# Patient Record
Sex: FEMALE | Race: WHITE | NOT HISPANIC OR LATINO | Employment: FULL TIME | URBAN - METROPOLITAN AREA
[De-identification: names, ages, dates, MRNs, and addresses within clinical notes are randomized per-mention and may not be internally consistent; named-entity substitution may affect disease eponyms.]

---

## 2014-09-03 LAB — PAP-ABSTRACT: NORMAL

## 2017-07-14 ENCOUNTER — TRANSFERRED RECORDS (OUTPATIENT)
Dept: HEALTH INFORMATION MANAGEMENT | Facility: CLINIC | Age: 52
End: 2017-07-14

## 2017-07-14 LAB — MAMMOGRAM: NORMAL

## 2019-03-27 ENCOUNTER — OFFICE VISIT (OUTPATIENT)
Dept: ENDOCRINOLOGY | Facility: CLINIC | Age: 54
End: 2019-03-27
Payer: COMMERCIAL

## 2019-03-27 VITALS
DIASTOLIC BLOOD PRESSURE: 85 MMHG | BODY MASS INDEX: 35.63 KG/M2 | HEIGHT: 67 IN | SYSTOLIC BLOOD PRESSURE: 143 MMHG | WEIGHT: 227 LBS | HEART RATE: 76 BPM

## 2019-03-27 DIAGNOSIS — E66.09 CLASS 2 OBESITY DUE TO EXCESS CALORIES WITHOUT SERIOUS COMORBIDITY WITH BODY MASS INDEX (BMI) OF 35.0 TO 35.9 IN ADULT: ICD-10-CM

## 2019-03-27 DIAGNOSIS — E66.812 CLASS 2 OBESITY DUE TO EXCESS CALORIES WITHOUT SERIOUS COMORBIDITY WITH BODY MASS INDEX (BMI) OF 35.0 TO 35.9 IN ADULT: ICD-10-CM

## 2019-03-27 DIAGNOSIS — E04.2 MULTIPLE THYROID NODULES: Primary | ICD-10-CM

## 2019-03-27 DIAGNOSIS — E03.9 HYPOTHYROIDISM, UNSPECIFIED TYPE: ICD-10-CM

## 2019-03-27 LAB
CALCIUM SERPL-MCNC: 9.3 MG/DL (ref 8.5–10.1)
T4 FREE SERPL-MCNC: 1.09 NG/DL (ref 0.76–1.46)
TSH SERPL DL<=0.005 MIU/L-ACNC: 1.32 MU/L (ref 0.4–4)

## 2019-03-27 PROCEDURE — 99203 OFFICE O/P NEW LOW 30 MIN: CPT | Mod: 25 | Performed by: INTERNAL MEDICINE

## 2019-03-27 PROCEDURE — 84443 ASSAY THYROID STIM HORMONE: CPT | Performed by: INTERNAL MEDICINE

## 2019-03-27 PROCEDURE — 99358 PROLONG SERVICE W/O CONTACT: CPT | Performed by: INTERNAL MEDICINE

## 2019-03-27 PROCEDURE — 36415 COLL VENOUS BLD VENIPUNCTURE: CPT | Performed by: INTERNAL MEDICINE

## 2019-03-27 PROCEDURE — 82310 ASSAY OF CALCIUM: CPT | Performed by: INTERNAL MEDICINE

## 2019-03-27 PROCEDURE — 86376 MICROSOMAL ANTIBODY EACH: CPT | Performed by: INTERNAL MEDICINE

## 2019-03-27 PROCEDURE — 84439 ASSAY OF FREE THYROXINE: CPT | Performed by: INTERNAL MEDICINE

## 2019-03-27 RX ORDER — LEVOTHYROXINE SODIUM 75 UG/1
75 TABLET ORAL
COMMUNITY
Start: 2018-02-01 | End: 2019-03-28

## 2019-03-27 RX ORDER — LEVOTHYROXINE SODIUM 75 UG/1
75 TABLET ORAL
Status: CANCELLED | OUTPATIENT
Start: 2019-03-27

## 2019-03-27 ASSESSMENT — MIFFLIN-ST. JEOR: SCORE: 1662.3

## 2019-03-27 NOTE — PROGRESS NOTES
Name: Sydney Madrigal is a 53 year old woman self-referred for evaluation of thyroid disease.    Chief Complaint   Patient presents with     Thyroid Problem       HPI:  Recent issues:  Here for evaluation of hypothyroidism and thyroid nodules  She had insurance change, plans to change physicians with her health care  Reviewed medical history from patient and Epic chart record        Pre-2006. Diagnosis of hypothyroidism  Details of initial evaluation, symptoms, lab testing not available  Started levothyroxine medication  She recalls chronic treatment with levothyroxine 0.075 mg daily dose  Has seen Hal Edmonds and Hector Teague[facility] [place]  2006. Previous diagnosis of thyroid nodules  Has had many thyroid U/S procedures in the past, possibly yearly... Scans at [facility] clinic  2006. Dominant thyroid nodule FNA.   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 Carey for general medicine evaluations.    PMH/PSH:  Past Medical History:   Diagnosis Date     Hyperglycemia during pregnancy      Hypothyroidism      Multiple thyroid nodules      Obesity      Reflux esophagitis      No past surgical history on file.    Family Hx:  No family history on file.      Social Hx:  Social History     Socioeconomic History     Marital status:      Spouse name: Not on file      Number of children: Not on file     Years of education: Not on file     Highest education level: Not on file   Occupational History     Not on file   Social Needs     Financial resource strain: Not on file     Food insecurity:     Worry: Not on file     Inability: Not on file     Transportation needs:     Medical: Not on file     Non-medical: Not on file   Tobacco Use     Smoking status: Never Smoker     Smokeless tobacco: Never Used   Substance and Sexual Activity     Alcohol use: Not on file     Drug use: Not on file     Sexual activity: Not on file   Lifestyle     Physical activity:     Days per week: Not on file     Minutes per session: Not on file     Stress: Not on file   Relationships     Social connections:     Talks on phone: Not on file     Gets together: Not on file     Attends Voodoo service: Not on file     Active member of club or organization: Not on file     Attends meetings of clubs or organizations: Not on file     Relationship status: Not on file     Intimate partner violence:     Fear of current or ex partner: Not on file     Emotionally abused: Not on file     Physically abused: Not on file     Forced sexual activity: Not on file   Other Topics Concern     Not on file   Social History Narrative     Not on file          MEDICATIONS:  has a current medication list which includes the following prescription(s): cholecalciferol and levothyroxine.    ROS:     ROS: 10 point ROS neg other than the symptoms noted above in the HPI.    GENERAL: energy good, wt stable; denies fevers, chills, night sweats.    HEENT: no dysphagia, odonophagia, diplopia, neck pain  THYROID:  no apparent hyper or hypothyroid symptoms  CV: no chest pain, pressure, palpitations  LUNGS: no SOB, DIXON, cough, wheezing   ABDOMEN: no diarrhea, constipation, abdominal pain  EXTREMITIES: no rashes, ulcers, edema  NEUROLOGY: no headaches, denies changes in vision, tingling, extremitiy numbness   MSK: achiness at hands and knees;  "denies muscle weakness  SKIN: no rashes or lesions  : no menses since hysterectomy 2014, has frequent hotflashes  PSYCH:  stable mood, no significant anxiety or depression  ENDOCRINE: no heat or cold intolerance    Physical Exam   VS: /85   Pulse 76   Ht 1.702 m (5' 7\")   Wt 103 kg (227 lb)   BMI 35.55 kg/m    GENERAL: AXOX3, NAD, well dressed, answering questions appropriately, appears stated age.  THYROID:  Fullness at left lower thyroid lobe though discrete nodules not palpable, gland size estimate 25 gm, no neck adenopathy  HEENT: neck non-tender, no exopthalmous, no proptosis, EOMI  CV: RRR, no rubs, gallops, no murmurs  LUNGS: CTAB, no wheezes, rales, or ronchi  ABDOMEN: obese, soft, nontender, nondistended  EXTREMITIES: no edema, no lesions  NEUROLOGY: CN grossly intact, no tremors  MSK: grossly intact  SKIN: no rashes, no lesions    LABS:    All pertinent notes, labs, and images personally reviewed by me.     A/P:  Encounter Diagnoses   Name Primary?     Multiple thyroid nodules Yes     Hypothyroidism, unspecified type      Class 2 obesity due to excess calories without serious comorbidity with body mass index (BMI) of 35.0 to 35.9 in adult        Comments:  Reviewed complicated health history and thyroid nodule issues.    Plan:  Discussed general issues with the thyroid nodule diagnosis and management  Reviewed thyroid gland anatomy and hormone physiology  Discussed lab tests used to assess patient thyroid hormone levels  We discussed the thyroid ultrasound imaging procedure  Reviewed treatment options biopsy, thyroidectomy (partial vs total), and observation plan    Recommend:  Continue current levothyroxine 0.075 mg daily dose at this time   Will update her thyroid med Rx ASAP  Discussed options for thyroid med dose adjustment, depending on thyroid lab results  Advised repeat thyroid U/S at [facility]   Will also request brief discussion with the radiologist about local anesthesia options if future " nodule FNA there   Discussed previous thyroid U/S report (2016) and this FNA anesthesia topic with Dr. Warren/DIYA today   Procedure order form faxed to their office  Monitor for symptom changes  Keep focus on diet, exercise, and weight loss management     Discussed importance of having a primary care practitioner (FP or Int Med) for general medicine appointments and also acute care visits.  Sydney to follow up with Primary Care provider regarding elevated blood pressure.  Addressed patient questions today    Labs ordered today:   Orders Placed This Encounter   Procedures     T4 free     TSH     Calcium     Thyroid peroxidase antibody     Radiology/Consults ordered today: None    More than 50% of the time spent with Ms. Madrigal on counseling / coordinating her care.  Total appointment time was 40 minutes.  Additional 30 minutes spent with review of outside medical records and labs in [facility]    Follow-up:  1 yr or prn    Doyle Perez MD  Endocrinology  [place] [facility]

## 2019-03-28 DIAGNOSIS — E03.9 HYPOTHYROIDISM, UNSPECIFIED TYPE: Primary | ICD-10-CM

## 2019-03-28 LAB — THYROPEROXIDASE AB SERPL-ACNC: <10 IU/ML

## 2019-03-28 RX ORDER — LEVOTHYROXINE SODIUM 75 UG/1
75 TABLET ORAL DAILY
Qty: 90 TABLET | Refills: 3 | Status: SHIPPED | OUTPATIENT
Start: 2019-03-28 | End: 2020-07-30

## 2019-04-03 ENCOUNTER — TRANSFERRED RECORDS (OUTPATIENT)
Dept: HEALTH INFORMATION MANAGEMENT | Facility: CLINIC | Age: 54
End: 2019-04-03

## 2019-06-04 ENCOUNTER — HOSPITAL ENCOUNTER (EMERGENCY)
Facility: CLINIC | Age: 54
Discharge: HOME | End: 2019-06-04
Attending: EMERGENCY MEDICINE | Admitting: EMERGENCY MEDICINE
Payer: COMMERCIAL

## 2019-06-04 ENCOUNTER — APPOINTMENT (OUTPATIENT)
Dept: CT IMAGING | Facility: CLINIC | Age: 54
End: 2019-06-04
Attending: EMERGENCY MEDICINE
Payer: COMMERCIAL

## 2019-06-04 VITALS
OXYGEN SATURATION: 96 % | HEART RATE: 63 BPM | RESPIRATION RATE: 11 BRPM | SYSTOLIC BLOOD PRESSURE: 143 MMHG | BODY MASS INDEX: 35.24 KG/M2 | WEIGHT: 225 LBS | TEMPERATURE: 98.3 F | DIASTOLIC BLOOD PRESSURE: 93 MMHG

## 2019-06-04 DIAGNOSIS — R03.0 ELEVATED BLOOD PRESSURE READING WITHOUT DIAGNOSIS OF HYPERTENSION: ICD-10-CM

## 2019-06-04 DIAGNOSIS — R00.2 PALPITATIONS: ICD-10-CM

## 2019-06-04 DIAGNOSIS — R93.89 ABNORMAL CT OF THE CHEST: ICD-10-CM

## 2019-06-04 LAB
ALBUMIN SERPL-MCNC: 4 G/DL (ref 3.4–5)
ALP SERPL-CCNC: 91 U/L (ref 40–150)
ALT SERPL W P-5'-P-CCNC: 29 U/L (ref 0–50)
ANION GAP SERPL CALCULATED.3IONS-SCNC: 7 MMOL/L (ref 3–14)
AST SERPL W P-5'-P-CCNC: 16 U/L (ref 0–45)
BASOPHILS # BLD AUTO: 0 10E9/L (ref 0–0.2)
BASOPHILS NFR BLD AUTO: 0.6 %
BILIRUB SERPL-MCNC: 0.3 MG/DL (ref 0.2–1.3)
BUN SERPL-MCNC: 22 MG/DL (ref 7–30)
CALCIUM SERPL-MCNC: 9.2 MG/DL (ref 8.5–10.1)
CHLORIDE SERPL-SCNC: 104 MMOL/L (ref 94–109)
CO2 SERPL-SCNC: 27 MMOL/L (ref 20–32)
CREAT SERPL-MCNC: 1.26 MG/DL (ref 0.52–1.04)
DIFFERENTIAL METHOD BLD: NORMAL
EOSINOPHIL # BLD AUTO: 0.2 10E9/L (ref 0–0.7)
EOSINOPHIL NFR BLD AUTO: 2.6 %
ERYTHROCYTE [DISTWIDTH] IN BLOOD BY AUTOMATED COUNT: 13.7 % (ref 10–15)
GFR SERPL CREATININE-BSD FRML MDRD: 48 ML/MIN/{1.73_M2}
GLUCOSE SERPL-MCNC: 116 MG/DL (ref 70–99)
HCT VFR BLD AUTO: 42.6 % (ref 35–47)
HGB BLD-MCNC: 13.4 G/DL (ref 11.7–15.7)
IMM GRANULOCYTES # BLD: 0 10E9/L (ref 0–0.4)
IMM GRANULOCYTES NFR BLD: 0.3 %
LYMPHOCYTES # BLD AUTO: 2.2 10E9/L (ref 0.8–5.3)
LYMPHOCYTES NFR BLD AUTO: 32.6 %
MCH RBC QN AUTO: 27.5 PG (ref 26.5–33)
MCHC RBC AUTO-ENTMCNC: 31.5 G/DL (ref 31.5–36.5)
MCV RBC AUTO: 88 FL (ref 78–100)
MONOCYTES # BLD AUTO: 0.4 10E9/L (ref 0–1.3)
MONOCYTES NFR BLD AUTO: 6.5 %
NEUTROPHILS # BLD AUTO: 3.8 10E9/L (ref 1.6–8.3)
NEUTROPHILS NFR BLD AUTO: 57.4 %
NRBC # BLD AUTO: 0 10*3/UL
NRBC BLD AUTO-RTO: 0 /100
PLATELET # BLD AUTO: 279 10E9/L (ref 150–450)
POTASSIUM SERPL-SCNC: 3.8 MMOL/L (ref 3.4–5.3)
PROT SERPL-MCNC: 7.5 G/DL (ref 6.8–8.8)
RBC # BLD AUTO: 4.87 10E12/L (ref 3.8–5.2)
SODIUM SERPL-SCNC: 138 MMOL/L (ref 133–144)
TROPONIN I SERPL-MCNC: <0.015 UG/L (ref 0–0.04)
TSH SERPL DL<=0.005 MIU/L-ACNC: 1.29 MU/L (ref 0.4–4)
WBC # BLD AUTO: 6.6 10E9/L (ref 4–11)

## 2019-06-04 PROCEDURE — 25000125 ZZHC RX 250: Performed by: EMERGENCY MEDICINE

## 2019-06-04 PROCEDURE — 80053 COMPREHEN METABOLIC PANEL: CPT | Performed by: EMERGENCY MEDICINE

## 2019-06-04 PROCEDURE — 99285 EMERGENCY DEPT VISIT HI MDM: CPT | Mod: 25 | Performed by: EMERGENCY MEDICINE

## 2019-06-04 PROCEDURE — 71260 CT THORAX DX C+: CPT

## 2019-06-04 PROCEDURE — 25000128 H RX IP 250 OP 636: Performed by: EMERGENCY MEDICINE

## 2019-06-04 PROCEDURE — 85025 COMPLETE CBC W/AUTO DIFF WBC: CPT | Performed by: EMERGENCY MEDICINE

## 2019-06-04 PROCEDURE — 93010 ELECTROCARDIOGRAM REPORT: CPT | Mod: Z6 | Performed by: EMERGENCY MEDICINE

## 2019-06-04 PROCEDURE — 84484 ASSAY OF TROPONIN QUANT: CPT | Performed by: EMERGENCY MEDICINE

## 2019-06-04 PROCEDURE — 84443 ASSAY THYROID STIM HORMONE: CPT | Performed by: EMERGENCY MEDICINE

## 2019-06-04 PROCEDURE — 93005 ELECTROCARDIOGRAM TRACING: CPT | Performed by: EMERGENCY MEDICINE

## 2019-06-04 RX ORDER — IOPAMIDOL 755 MG/ML
80 INJECTION, SOLUTION INTRAVASCULAR ONCE
Status: COMPLETED | OUTPATIENT
Start: 2019-06-04 | End: 2019-06-04

## 2019-06-04 RX ADMIN — SODIUM CHLORIDE 80 ML: 9 INJECTION, SOLUTION INTRAVENOUS at 15:06

## 2019-06-04 RX ADMIN — IOPAMIDOL 80 ML: 755 INJECTION, SOLUTION INTRAVENOUS at 15:06

## 2019-06-04 ASSESSMENT — ENCOUNTER SYMPTOMS
HEMATOLOGIC/LYMPHATIC NEGATIVE: 1
EYES NEGATIVE: 1
MUSCULOSKELETAL NEGATIVE: 1
NEUROLOGICAL NEGATIVE: 1
ALLERGIC/IMMUNOLOGIC NEGATIVE: 1
PSYCHIATRIC NEGATIVE: 1
CHEST TIGHTNESS: 1
TROUBLE SWALLOWING: 1
CONSTITUTIONAL NEGATIVE: 1
PALPITATIONS: 1
ENDOCRINE NEGATIVE: 1
GASTROINTESTINAL NEGATIVE: 1

## 2019-06-04 NOTE — ED NOTES
Pt presents to ED for complaint of palpitations for the past 3 days. Pt reports it feels like she has an extra beat in the chest. It has not interfered with he sleep. She has a very slight pain in her chest back to her left shoulder, rates it <1/10. No SOA, nausea or or vomiting or diarrhea. Some intermittent dizziness.

## 2019-06-04 NOTE — DISCHARGE INSTRUCTIONS
1) Your time and evaluation in the department today did not reveal a life threatening process. We have discussed your imaging results and your report of a history of a bronchogenic cyst.    2) We have also discussed that the cause of your palpitations is reported over the last 3 days is not clear. We have reviewed the importance of follow-up if palpitations persist including a Holter monitor.  As you do not currently have a primary care provider clinic was made for you in clinic to establish primary care for follow-up to discuss symptoms of palpitations if it persist and to follow-up in your blood pressure    3) if your symptoms worsen including persistent chest pain or pressure, you develop have a fainting spell, have any new concerns he should return to the emergency department for reevaluation additional care.

## 2019-06-04 NOTE — ED AVS SNAPSHOT
[facility]  [address] [address]  Phone:  [phone]  Fax:  [phone]                                    Sydney Madrigal   MRN: [id number]    Department:  [facility]   Date of Visit:  6/4/2019           After Visit Summary Signature Page    I have received my discharge instructions, and my questions have been answered. I have discussed any challenges I see with this plan with the nurse or doctor.    ..........................................................................................................................................  Patient/Patient Representative Signature      ..........................................................................................................................................  Patient Representative Print Name and Relationship to Patient    ..................................................               ................................................  Date                                   Time    ..........................................................................................................................................  Reviewed by Signature/Title    ...................................................              ..............................................  Date                                               Time          22EPIC Rev 08/18

## 2019-06-04 NOTE — ED PROVIDER NOTES
History     Chief Complaint   Patient presents with     Palpitations     intermittent for 3 days     HPI  Sydney Madrigal is a 53 year old female who presents to the ED complaining of palpitations starting Sunday (3 days ago). She states [place] was at a [place] when she started to fell like her heart rate was irregular. The patient felt like her heart was beating harder and skipping a beat. She has also had some left chest tightness that radiates through her chest to her left scapula. Her mother missed a step and fell so she went to the ED to be evaluated which made the patient think the palpitations might be related to anxiety. Patient notes she has a bronchogenic mass and she hasn't had it imaged recently because she had an insurance change. She does note she fell on 4/11/19 and had an XR of the chest which mentioned the mass, but didn't compare it to previous studies done at [facility] imaging. She adds she seems to be having more issues tolerating solids, last week she noticed after eating a hot dog she had some strange reflux afterwards. Patient denies SOB or lower extremity swelling.    FHx: Mother has HTN possible other cardiac issues.    Social History: Patient lives in [place], [place]. 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    Allergies:  No Known Allergies    Problem List:    There are no active problems to display for this patient.       Past Medical History:    Past Medical History:   Diagnosis Date     Hx of hysterectomy      Hyperglycemia during pregnancy      Hypothyroidism      Multiple thyroid nodules      Obesity      Reflux esophagitis      Vitamin D deficiency        Past Surgical History:    Past Surgical History:   Procedure Laterality Date     BIOPSY OF SKIN LESION  2012    lipoma? resection abdomen and right thigh     BUNIONECTOMY  1985    bilateral     C EXCISION OF GANGLION CYST FOREARM  1997    from right hand     ENDOMETRIAL SAMPLING (BIOPSY)         Family History:    No family history  on file.    Social History:  Marital Status:   [2]  Social History     Tobacco Use     Smoking status: Never Smoker     Smokeless tobacco: Never Used   Substance Use Topics     Alcohol use: Not on file     Drug use: Not on file        Medications:      cholecalciferol ( ULTRA STRENGTH) 2000 units CAPS   levothyroxine (SYNTHROID/LEVOTHROID) 75 MCG tablet         Review of Systems   Constitutional: Negative.    HENT: Positive for trouble swallowing.    Eyes: Negative.    Respiratory: Positive for chest tightness.    Cardiovascular: Positive for chest pain and palpitations.   Gastrointestinal: Negative.    Endocrine: Negative.    Genitourinary: Negative.    Musculoskeletal: Negative.    Skin: Negative.    Allergic/Immunologic: Negative.    Neurological: Negative.    Hematological: Negative.    Psychiatric/Behavioral: Negative.    All other systems reviewed and are negative.      Physical Exam   BP: 145/88  Pulse: 75  Heart Rate: 89  Temp: 98.3  F (36.8  C)  Resp: 13  Weight: 102.1 kg (225 lb)  SpO2: 98 %      Physical Exam   Constitutional: She is oriented to person, place, and time. She appears well-developed and well-nourished. No distress.   HENT:   Head: Normocephalic and atraumatic.   Eyes: Pupils are equal, round, and reactive to light. Conjunctivae and EOM are normal. Right eye exhibits no discharge. Left eye exhibits no discharge. No scleral icterus.   Neck: Normal range of motion. Neck supple. No JVD present. No tracheal deviation present. No thyromegaly present.   Cardiovascular: Normal rate and regular rhythm. Exam reveals no gallop and no friction rub.   No murmur heard.  Pulmonary/Chest: Effort normal and breath sounds normal. No stridor. No respiratory distress. She has no wheezes. She has no rales. She exhibits no tenderness.   Abdominal: Soft. Bowel sounds are normal. She exhibits no distension and no mass. There is no tenderness. There is no rebound and no guarding. No hernia.    Musculoskeletal: She exhibits no edema, tenderness or deformity.   Lymphadenopathy:     She has no cervical adenopathy.   Neurological: She is alert and oriented to person, place, and time. She displays normal reflexes. No cranial nerve deficit or sensory deficit. She exhibits normal muscle tone. Coordination normal.   Skin: Skin is warm and dry. Capillary refill takes less than 2 seconds. No rash noted. She is not diaphoretic. No erythema. No pallor.   Psychiatric: She has a normal mood and affect. Her behavior is normal. Judgment and thought content normal.       ED Course        Procedures               EKG Interpretation:      Interpreted by Caden Zacarias  Time reviewed: 13:50  Symptoms at time of EKG: Palpitations   Rhythm: normal sinus   Rate: Normal  Axis: Normal  Ectopy: none  Conduction: normal  ST Segments/ T Waves: Non-specific ST-T wave changes  Q Waves: nonspecific  Comparison to prior: No old EKG for comparison.    Clinical Impression: no acute changes      Critical Care time:  none             ED medications:  Medications   iopamidol (ISOVUE-370) solution 80 mL (80 mLs Intravenous Given 6/4/19 1506)   sodium chloride 0.9 % bag 500mL for CT scan flush use (80 mLs Intravenous Given 6/4/19 1506)       ED labs and imaging:  Results for orders placed or performed during the hospital encounter of 06/04/19   CT Chest w Contrast    Narrative    CT CHEST WITH CONTRAST  6/4/2019 3:17 PM     HISTORY:  Substernal chest pain and pressure radiating to the left  scapula.  History of right bronchogenic cyst.  Report of dysphasia,  with a history of hypothyroidism. (Last CT in 2014 at [facility]). Evaluate for acute cardiothoracic process.    TECHNIQUE: Axial images from the thoracic inlet to the lung bases are  performed with additional coronal reformatted images. 80 mL of Isovue  370 are given intravenously.  Radiation dose for this scan was reduced  using automated exposure control, adjustment of  the mA and/or kV  according to patient size, or iterative reconstruction technique.    FINDINGS:   Chest: Lungs are clear. No infiltrate or consolidation. No pleural or  pericardial fluid. Heart is normal in size. Esophagus is unremarkable.  Thyroid gland is normal in size. No enlarged lymph nodes. Complex  cystic lesion is noted along the posterior right superior mediastinum  at approximately the T3-T4 level. On image 16 of series 2 this  structure measures 5.1 x 3.2 cm and would be consistent with patient's  history of bronchogenic cyst. No prior exam available for comparison.  This structure abuts the posterolateral right wall of the trachea  above the level of the deepti. This does not abut the esophagus. No  evidence of neural foraminal widening along the spine bordering this  cystic mass. Thoracic aorta is unremarkable. Limited images upper  abdomen demonstrate fatty infiltration of the liver and an  indeterminate posterior right hepatic lobe lesion measuring 2 cm in  diameter on series 2, image 61. This may reflect a hemangioma or FNH.  Follow-up MRI liver could be performed for confirmation. Upper  abdominal organs are otherwise within normal limits where imaged. Bone  window examination is unremarkable.      Impression    IMPRESSION:  1. 5 cm cystic structure along the posterior right mediastinum  abutting the posterolateral right wall of the trachea. Findings would  be consistent with patient's history of bronchogenic cyst; however, no  prior study is available for direct comparison. This lesion does not  abut the esophagus and does not widen adjacent neural foramina along  the spine.  2. Lungs are clear. No enlarged lymph nodes.  3. Fatty infiltration of the liver with indeterminate posterior right  hepatic lobe lesion. Follow-up MRI liver recommended for further  characterization if not previously performed.    WILDER AQUINO MD   CBC with platelets differential   Result Value Ref Range    WBC 6.6 4.0 -  11.0 10e9/L    RBC Count 4.87 3.8 - 5.2 10e12/L    Hemoglobin 13.4 11.7 - 15.7 g/dL    Hematocrit 42.6 35.0 - 47.0 %    MCV 88 78 - 100 fl    MCH 27.5 26.5 - 33.0 pg    MCHC 31.5 31.5 - 36.5 g/dL    RDW 13.7 10.0 - 15.0 %    Platelet Count 279 150 - 450 10e9/L    Diff Method Automated Method     % Neutrophils 57.4 %    % Lymphocytes 32.6 %    % Monocytes 6.5 %    % Eosinophils 2.6 %    % Basophils 0.6 %    % Immature Granulocytes 0.3 %    Nucleated RBCs 0 0 /100    Absolute Neutrophil 3.8 1.6 - 8.3 10e9/L    Absolute Lymphocytes 2.2 0.8 - 5.3 10e9/L    Absolute Monocytes 0.4 0.0 - 1.3 10e9/L    Absolute Eosinophils 0.2 0.0 - 0.7 10e9/L    Absolute Basophils 0.0 0.0 - 0.2 10e9/L    Abs Immature Granulocytes 0.0 0 - 0.4 10e9/L    Absolute Nucleated RBC 0.0    Comprehensive metabolic panel   Result Value Ref Range    Sodium 138 133 - 144 mmol/L    Potassium 3.8 3.4 - 5.3 mmol/L    Chloride 104 94 - 109 mmol/L    Carbon Dioxide 27 20 - 32 mmol/L    Anion Gap 7 3 - 14 mmol/L    Glucose 116 (H) 70 - 99 mg/dL    Urea Nitrogen 22 7 - 30 mg/dL    Creatinine 1.26 (H) 0.52 - 1.04 mg/dL    GFR Estimate 48 (L) >60 mL/min/[1.73_m2]    GFR Estimate If Black 56 (L) >60 mL/min/[1.73_m2]    Calcium 9.2 8.5 - 10.1 mg/dL    Bilirubin Total 0.3 0.2 - 1.3 mg/dL    Albumin 4.0 3.4 - 5.0 g/dL    Protein Total 7.5 6.8 - 8.8 g/dL    Alkaline Phosphatase 91 40 - 150 U/L    ALT 29 0 - 50 U/L    AST 16 0 - 45 U/L   Troponin I   Result Value Ref Range    Troponin I ES <0.015 0.000 - 0.045 ug/L   TSH with free T4 reflex   Result Value Ref Range    TSH 1.29 0.40 - 4.00 mU/L         ED Vitals:  Vitals:    06/04/19 1700 06/04/19 1715 06/04/19 1730 06/04/19 1745   BP: (!) 141/91  (!) 143/93    Pulse: 63  63    Resp: 12 9 13 11   Temp:       TempSrc:       SpO2: 95% 93% 94% 96%   Weight:           Assessments & Plan (with Medical Decision Making)   Clinical impression: 53-year-old female who presented for evaluation for 3-day history of intermittent  "palpitations.  Patient has a history of hypothyroidism and is on Synthroid.  She was evaluated at [facility] on 4/11/2019 after mechanical fall.  She was diagnosed with a right shoulder strain and AC separation. She was incidentally noted to have a mediastinal mass on chest x-ray and reports a history of bronchogenic cyst followed by pulmonary medicine with last CT imaging in 2014 ([facility]).  No recent imaging since 2014 due to \"medical insurance reasons\".  She arrived today stating on Sunday June-2 2019 after she was at the [facility] with her mother who unfortunately sustained a mechanical fall she began to notice that her heart was skipping a beat.  She also reports substernal chest discomfort radiating to her left scapula.  She reports no fainting spells and no near fainting spells.  She is also reporting no extremity swelling.  No dyspnea on exertion.  She has been compliant with her Synthroid and reports her last TSH was a couple months ago.  TSH was 1.32 March 17, 2019.  She reports she is had some difficulty with swallowing specifically with solids and liquids and reports she started choked on a hot dog last week.  Had no unintentional weight change.  She did have a hot flash during our conversation.  There is no skin lesions or rashes about the chest wall.  On my exam she was in no acute distress she had a normal cardiac and lung exam.  She was in sinus rhythm on telemetry without any arrhythmia or premature beats captured.  Patient was currently  at work- casual pharmacy technician.       ED course and Plan:  I reviewed patient's medical records including her recent evaluation in the emergency department at [facility] in April 2019.  EKG on ED arrival today shows normal axis, T wave flattening in lead III.  Nonspecific T wave changes.  No old EKG for comparison and no acute ischemia or arrhythmias appreciated.  We discussed possible causes for her sense of palpitations.  Her exam " is not concerning for consistent with an acute arrhythmia no premature beats are captured on telemetry.  We discussed her history of bronchogenic cyst and no recent surveillance imaging that it may be beneficial to image her chest to evaluate for any mediastinal changes with her report of substernal chest pain and discomfort.  She was monitored in telemetry and blood work was obtained.    Her work-up today revealed no acute process-normal TSH,negative troponin.  Chest imaging with contrast revealed known diagnosis of a bronchogenic cyst.  No prior comparison study. There was no compression of mediastinal structures that would explain patient's reported symptoms of palpitations.  Please see the interpreting radiologist report above.    Patient inquired about her elevated blood pressure readings during her care.  We discussed follow-up care in clinic for better blood pressure readings without a diagnosis of hypertension.  No indication to initiate therapy for hypertension at this time.    Clinic appointment was made for the patient with Dr Andre- on 6/10/19 at 9:20 AM to establish primary care to follow-up on elevated blood pressure readings and her symptoms of palpitations.  We discussed the importance of following up for surveillance imaging with her known history of bronchogenic cyst.  We reviewed reasons to return for reevaluation additional care in the emergency department including but not limited to hemoptysis, chest pain or pressure with fainting spells, dyspnea on exertion or any other new concerns. Patient expressed comfort with and understanding of  plan of care.        Disclaimer: This note consists of symbols derived from keyboarding, dictation and/or voice recognition software. As a result, there may be errors in the script that have gone undetected. Please consider this when interpreting information found in this chart.  I have reviewed the nursing notes.    I have reviewed the findings, diagnosis,  plan and need for follow up with the patient.          Medication List      There are no discharge medications for this visit.         Final diagnoses:   Palpitations - Intermittent over the last 3 days. unclear cause. Recommend follow-up care   Elevated blood pressure reading without diagnosis of hypertension - No known history of hypertension. BP~ 140/90's (need for follow-up)   Abnormal CT of the chest - Report of a known brochogenic cyst. Imaging today showing no new complications. Unable to compare size with no old imaging available for comparison     This document serves as a record of the services and decisions personally performed and made by Caden Zacarias. The HPI was created on his behalf by Kelsi Cortez, a trained medical scribe. The creation of this document is based on the provider's statements to the medical scribe  Kelsi Cortez  2:28 PM June 4, 2019 6/4/2019   [facility] EMERGENCY DEPARTMENT     Caden Zacarias MD  06/04/19 2147

## 2019-06-10 ENCOUNTER — DOCUMENTATION ONLY (OUTPATIENT)
Dept: CARE COORDINATION | Facility: CLINIC | Age: 54
End: 2019-06-10

## 2019-06-10 ENCOUNTER — HOSPITAL ENCOUNTER (OUTPATIENT)
Dept: CARDIOLOGY | Facility: CLINIC | Age: 54
Discharge: HOME | End: 2019-06-10
Attending: FAMILY MEDICINE | Admitting: FAMILY MEDICINE
Payer: COMMERCIAL

## 2019-06-10 ENCOUNTER — OFFICE VISIT (OUTPATIENT)
Dept: FAMILY MEDICINE | Facility: CLINIC | Age: 54
End: 2019-06-10
Payer: COMMERCIAL

## 2019-06-10 VITALS
WEIGHT: 226.6 LBS | SYSTOLIC BLOOD PRESSURE: 128 MMHG | HEART RATE: 76 BPM | RESPIRATION RATE: 16 BRPM | DIASTOLIC BLOOD PRESSURE: 84 MMHG | OXYGEN SATURATION: 98 % | HEIGHT: 67 IN | BODY MASS INDEX: 35.56 KG/M2 | TEMPERATURE: 98.3 F

## 2019-06-10 DIAGNOSIS — R00.2 PALPITATIONS: ICD-10-CM

## 2019-06-10 DIAGNOSIS — I10 BENIGN ESSENTIAL HYPERTENSION: Primary | ICD-10-CM

## 2019-06-10 PROCEDURE — 0296T ZIO PATCH HOLTER ADULT PEDIATRIC GREATER THAN 48 HRS: CPT

## 2019-06-10 PROCEDURE — 0298T ZIO PATCH HOLTER ADULT PEDIATRIC GREATER THAN 48 HRS: CPT | Performed by: INTERNAL MEDICINE

## 2019-06-10 PROCEDURE — 99214 OFFICE O/P EST MOD 30 MIN: CPT | Performed by: FAMILY MEDICINE

## 2019-06-10 RX ORDER — HYDROCHLOROTHIAZIDE 12.5 MG/1
25 TABLET ORAL DAILY
Qty: 30 TABLET | Refills: 0 | Status: SHIPPED | OUTPATIENT
Start: 2019-06-10 | End: 2019-07-02

## 2019-06-10 RX ORDER — LOSARTAN POTASSIUM 25 MG/1
25 TABLET ORAL DAILY
Qty: 30 TABLET | Refills: 1 | Status: SHIPPED | OUTPATIENT
Start: 2019-06-10 | End: 2019-06-10

## 2019-06-10 RX ORDER — IBUPROFEN 200 MG
200 TABLET ORAL EVERY 4 HOURS PRN
COMMUNITY

## 2019-06-10 RX ORDER — ACETAMINOPHEN 325 MG/1
325-650 TABLET ORAL EVERY 6 HOURS PRN
COMMUNITY

## 2019-06-10 ASSESSMENT — MIFFLIN-ST. JEOR: SCORE: 1652.54

## 2019-06-10 NOTE — TELEPHONE ENCOUNTER
RECORDS RECEIVED FROM: R knee- crunching sound- turn sideways- swells up immediately and pain- appt per pt   DATE RECEIVED: 6/10   NOTES STATUS DETAILS   OFFICE NOTE from referring provider N/A    OFFICE NOTE from other specialist N/A    DISCHARGE SUMMARY from hospital N/A    DISCHARGE REPORT from the ER N/A    OPERATIVE REPORT N/A    MEDICATION LIST Internal    IMPLANT RECORD/STICKER N/A    LABS     CBC/DIFF Internal    CULTURES N/A    INJECTIONS DONE IN RADIOLOGY N/A    MRI N/A    CT SCAN N/A    XRAYS (IMAGES & REPORTS) N/A    TUMOR     PATHOLOGY  Slides & report N/A

## 2019-06-10 NOTE — PATIENT INSTRUCTIONS
Thank you for choosing [facility].  You may be receiving an email and/or telephone survey request from [facility] Customer Experience regarding your visit today.  Please take a few minutes to respond to the survey to let us know how we are doing.      If you have questions or concerns, please contact us via [employer] or you can contact your care team at [phone].    Our Clinic hours are:  Monday 6:40 am  to 7:00 pm  Tuesday -Friday 6:40 am to 5:00 pm    The [place] outpatient lab hours are:  Monday - Friday 6:10 am to 4:45 pm  Saturdays 7:00 am to 11:00 am  Appointments are required, call [phone]    If you have clinical questions after hours or would like to schedule an appointment,  call the clinic at [phone].    Patient Education     Discharge Instructions for High Blood Pressure (Hypertension)  You have been diagnosed with high blood pressure (also called hypertension). This means the force of blood against your artery walls is too strong. It also means your heart is working hard to move blood. High blood pressure usually has no symptoms, but over time, it can cause serious health problems. High blood pressure raises your risk for heart attack, stroke, heart failure, kidney disease, and blindness. With help from your doctor, you can manage your blood pressure and protect your health.  Blood pressure measurements are given as 2 numbers. Systolic blood pressure is the upper number. This is the pressure when the heart contracts. Diastolic blood pressure is the lower number. This is the pressure when the heart relaxes between beats.  Blood pressure is categorized as normal, elevated, or stage 1 or stage 2 high blood pressure:    Normal blood pressure is systolic of less than 120 and diastolic of less than 80 (120/80)    Elevated blood pressure is systolic of 120 to 129 and diastolic less than 80    Stage 1 high blood pressure is systolic is 130 to 139 or diastolic between 80 to 89    Stage 2  "high blood pressure is when systolic is 140 or higher or the diastolic is 90 or higher  Taking medicine    Learn to take your own blood pressure. Keep a record of your results. Ask your doctor which readings mean that you need medical attention.    Take your blood pressure medicine exactly as directed. Don t skip doses. Missing doses can cause your blood pressure to get out of control.    If you do miss a dose (or doses) check with your healthcare provider about what to do.    Don't take medicines that contain heart stimulants, including over-the-counter medicines. Check for warnings about high blood pressure on the label. Ask the pharmacist before purchasing something you haven't used before    Check with your doctor or pharmacist before taking a decongestant. Some decongestants can worsen high blood pressure.  Lifestyle changes    Maintain a healthy weight. Get help to lose any extra pounds.    Cut back on salt.  ? Limit canned, dried, packaged, and fast foods.  ? Don t add salt to your food at the table.  ? Season foods with herbs instead of salt when you cook.  ? Request no added salt when you go to a restaurant.  ? The American Heart Association (AHA) says the \"ideal\" amount of sodium is no more than 1,500 mg a day.  But because Americans eat so much salt, you can make a positive change by cutting back to even 2,400 mg of sodium a day.     Follow the DASH (Dietary Approaches to Stop Hypertension) eating plan. This plan recommends vegetables, fruits, whole gains, and other heart healthy foods.    Begin an exercise program. Ask your healthcare provider how to get started. The AHA recommends aerobic exercise 3 to 4 times a week for an average of 40 minutes at a time, with your provider's approval. Simple activities like walking or gardening can help.    Break the smoking habit. Enroll in a stop-smoking program to improve your chances of success. Ask your healthcare provider about programs and medicines to help you " stop smoking.    Limit drinks that contain caffeine such as coffee, black or green tea, and cola to 2 per day.    Never take stimulants such as amphetamines or cocaine. These drugs can be deadly for someone with high blood pressure.    Control your stress. Learn ways to manage stress.    Limit alcohol to no more than 1 drink a day for women and 2 drinks a day for men.  Follow-up care  Make a follow-up appointment as directed.  When to call your healthcare provider  Call your healthcare provider right away if you have any of the following:    Chest pain or shortness of breath (call 911)    Moderate to severe headache    Weakness in the muscles of your face, arms, or legs    Trouble speaking    Extreme drowsiness    Confusion    Fainting or dizziness    Pulsating or rushing sound in your ears    Unexplained nosebleed    Weakness, tingling, or numbness of your face, arms, or legs    Change in vision    Blood pressure measured at home that is greater than 180/110   Date Last Reviewed: 4/27/2016 2000-2018 [email or web address]. [address] [place] [address], [place] [address]. All rights reserved. This information is not intended as a substitute for professional medical care. Always follow your healthcare professional's instructions.

## 2019-06-10 NOTE — PROGRESS NOTES
Subjective     Sydney Madrigal is a 53 year old female who presents to clinic today for the following health issues:  53 yr old female here for an ER follow up. She was seen and evaluated for palpitations.The palpitations lasts a few seconds to minutes.Work up was essentially normal . She says she is still having the palpitations, she has on occasion had dizzy spells . Patient reports that she has had no shortness of breath but she does mention on occasion that she has chest pain localized to the left side of her chest and described this as a dull ache.   Her blood pressure was noted to be high in the ED and over time that has been the case. Patient open to starting medication   Chief Complaint   Patient presents with     ER F/U     6/4 palpitations      Health Maintenance     colonoscopy done 2017, repeat 5 yrs, mammo 2017- Due , Due for [place]     Patient Request     patient will be hiking in the [place] in November, she wants to make sure she us healthy enought to do this.          ED/UC Followup:    Facility:  [facility]   Date of visit: 6/4/19  Reason for visit: Palpitations   Current Status:  Still feels symptoms ( heart beat) on upper chest and near ears . Symptoms come and go. Has had pains in Left side chest which go thru to her left shoulder blade. Comes and Goes .            There is no problem list on file for this patient.    Past Surgical History:   Procedure Laterality Date     BIOPSY OF SKIN LESION  2012    lipoma? resection abdomen and right thigh     BUNIONECTOMY  1985    bilateral     C EXCISION OF GANGLION CYST FOREARM  1997    from right hand     ENDOMETRIAL SAMPLING (BIOPSY)         Social History     Tobacco Use     Smoking status: Former Smoker     Last attempt to quit: 6/10/2004     Years since quitting: 15.0     Smokeless tobacco: Never Used   Substance Use Topics     Alcohol use: Yes     Comment: occ.      Family History   Problem Relation Age of Onset     Chronic  "Obstructive Pulmonary Disease Mother      Emphysema Mother      Hypertension Mother      Aortic aneurysm Father      Breast Cancer Maternal Grandmother      Cancer Paternal Grandmother         Uterine         Current Outpatient Medications   Medication Sig Dispense Refill     acetaminophen (TYLENOL) 325 MG tablet Take 325-650 mg by mouth every 6 hours as needed for mild pain       cholecalciferol ( ULTRA STRENGTH) 2000 units CAPS Take 2,000 Units by mouth daily        hydrochlorothiazide (HYDRODIURIL) 12.5 MG tablet Take 2 tablets (25 mg) by mouth daily 30 tablet 0     ibuprofen (ADVIL/MOTRIN) 200 MG tablet Take 200 mg by mouth every 4 hours as needed for mild pain 2-4 tablets when needed       levothyroxine (SYNTHROID/LEVOTHROID) 75 MCG tablet Take 1 tablet (75 mcg) by mouth daily 90 tablet 3     losartan (COZAAR) 25 MG tablet Take 1 tablet (25 mg) by mouth daily 30 tablet 1     No Known Allergies  BP Readings from Last 3 Encounters:   06/10/19 128/84   06/04/19 (!) 143/93   03/27/19 143/85    Wt Readings from Last 3 Encounters:   06/10/19 102.8 kg (226 lb 9.6 oz)   06/04/19 102.1 kg (225 lb)   03/27/19 103 kg (227 lb)                      Reviewed and updated as needed this visit by Provider         Review of Systems   ROS COMP: Constitutional, HEENT, cardiovascular, pulmonary, gi and gu systems are negative, except as otherwise noted.      Objective    BP (!) 144/92 (BP Location: Right arm, Patient Position: Chair, Cuff Size: Adult Large)   Pulse 76   Temp 98.3  F (36.8  C) (Tympanic)   Resp 16   Ht 1.689 m (5' 6.5\")   Wt 102.8 kg (226 lb 9.6 oz)   SpO2 98%   Breastfeeding? No   BMI 36.03 kg/m    Body mass index is 36.03 kg/m .  Physical Exam   GENERAL: healthy, alert and no distress  EYES: Eyes grossly normal to inspection, PERRL and conjunctivae and sclerae normal  HENT: ear canals and TM's normal, nose and mouth without ulcers or lesions  NECK: no adenopathy, no asymmetry, masses, or scars and " thyroid normal to palpation  RESP: lungs clear to auscultation - no rales, rhonchi or wheezes  CV: regular rate and rhythm, normal S1 S2, no S3 or S4, no murmur, click or rub, no peripheral edema and peripheral pulses strong  ABDOMEN: soft, nontender, no hepatosplenomegaly, no masses and bowel sounds normal  MS: no gross musculoskeletal defects noted, no edema    Diagnostic Test Results:  none         Assessment & Plan     1. Benign essential hypertension  - Basic metabolic panel FUTURE anytime; Future  - hydrochlorothiazide (HYDRODIURIL) 12.5 MG tablet; Take 2 tablets (25 mg) by mouth daily  Dispense: 30 tablet; Refill: 0    2. Palpitations  Patient will be notified of results  - Zio Patch Holter Adult Pediatric Greater than 48 hrs; Future          FUTURE APPOINTMENTS:       - Follow-up visit in one month or sooner as needed.    No follow-ups on file.    Padmini Andre MD  [facility]

## 2019-06-10 NOTE — Clinical Note
Please abstract thru care everywhere -pappe - 9/3/14, colonoscopy 9/11/2017 and mammogram 7/14/17 done at [facility].  Thank You

## 2019-06-11 ENCOUNTER — OFFICE VISIT (OUTPATIENT)
Dept: ORTHOPEDICS | Facility: CLINIC | Age: 54
End: 2019-06-11
Payer: COMMERCIAL

## 2019-06-11 ENCOUNTER — ANCILLARY PROCEDURE (OUTPATIENT)
Dept: GENERAL RADIOLOGY | Facility: CLINIC | Age: 54
End: 2019-06-11
Payer: COMMERCIAL

## 2019-06-11 ENCOUNTER — PRE VISIT (OUTPATIENT)
Dept: ORTHOPEDICS | Facility: CLINIC | Age: 54
End: 2019-06-11

## 2019-06-11 VITALS
DIASTOLIC BLOOD PRESSURE: 85 MMHG | SYSTOLIC BLOOD PRESSURE: 138 MMHG | HEIGHT: 67 IN | WEIGHT: 226.63 LBS | BODY MASS INDEX: 35.57 KG/M2

## 2019-06-11 DIAGNOSIS — M25.561 RIGHT KNEE PAIN, UNSPECIFIED CHRONICITY: ICD-10-CM

## 2019-06-11 DIAGNOSIS — M22.41 CHONDROMALACIA OF RIGHT PATELLA: ICD-10-CM

## 2019-06-11 DIAGNOSIS — M25.561 RIGHT KNEE PAIN, UNSPECIFIED CHRONICITY: Primary | ICD-10-CM

## 2019-06-11 DIAGNOSIS — M76.899 QUADRICEPS TENDONITIS: Primary | ICD-10-CM

## 2019-06-11 ASSESSMENT — PAIN SCALES - GENERAL: PAINLEVEL: NO PAIN (0)

## 2019-06-11 ASSESSMENT — MIFFLIN-ST. JEOR: SCORE: 1652.69

## 2019-06-11 NOTE — LETTER
Date:June 12, 2019      Patient was self referred, no letter generated. Do not send.        [facility] Information

## 2019-06-11 NOTE — LETTER
"  6/11/2019      RE: Sydney Wyattbecka  [address]  [place] [address]       Pt is a 53 year old female here today for:     HPI:   R knee pain and crunching  - 1 year ago sat down and had extreme sharp pain that has since shifted towards more dull/aching  - uses ice and ibuprofen occasionally  - no catching/locking or feelings of gross instability  - has noticed a nontender \"lump\" superior to the medial joint line  - sx especially prevalent in knee flexion with activities like driving  - notices crunching when going up the stairs  - going to the [place] in November and wants to be ready for hiking  - no hx of injuries to the knee    Past Medical History:   Diagnosis Date     Hx of hysterectomy      Hyperglycemia during pregnancy      Hypothyroidism      Multiple thyroid nodules      Obesity      Reflux esophagitis      Vitamin D deficiency       Past Surgical History:   Procedure Laterality Date     BIOPSY OF SKIN LESION  2012    lipoma? resection abdomen and right thigh     BUNIONECTOMY  1985    bilateral     C EXCISION OF GANGLION CYST FOREARM  1997    from right hand     ENDOMETRIAL SAMPLING (BIOPSY)        Current Outpatient Medications   Medication Sig Dispense Refill     acetaminophen (TYLENOL) 325 MG tablet Take 325-650 mg by mouth every 6 hours as needed for mild pain       cholecalciferol ( ULTRA STRENGTH) 2000 units CAPS Take 2,000 Units by mouth daily        ibuprofen (ADVIL/MOTRIN) 200 MG tablet Take 200 mg by mouth every 4 hours as needed for mild pain 2-4 tablets when needed       levothyroxine (SYNTHROID/LEVOTHROID) 75 MCG tablet Take 1 tablet (75 mcg) by mouth daily 90 tablet 3     hydrochlorothiazide (HYDRODIURIL) 12.5 MG tablet Take 2 tablets (25 mg) by mouth daily (Patient not taking: Reported on 6/11/2019) 30 tablet 0      No Known Allergies   Social History     Tobacco Use     Smoking status: Former Smoker     Last attempt to quit: 6/10/2004     Years since quitting: 15.0 " "    Smokeless tobacco: Never Used   Substance Use Topics     Alcohol use: Yes     Comment: occ.      Drug use: Never      Family History   Problem Relation Age of Onset     Chronic Obstructive Pulmonary Disease Mother      Emphysema Mother      Hypertension Mother      Aortic aneurysm Father      Breast Cancer Maternal Grandmother      Cancer Paternal Grandmother         Uterine      ROS:   Gen- no fevers/chills   Rheum - no morning stiffness   Derm - no rash/ redness   Neuro - no numbness, no tingling   Remainder of ROS negative.     Exam:   Ht 1.689 m (5' 6.5\")   Wt 102.8 kg (226 lb 10.1 oz)   BMI 36.03 kg/m          R Knee:   No effusion  Full ROM   +crepitus  5/5 strength  No tenderness at medial or lateral joint line  Neg patellar compression  +TTP at quad insertion and + soft tissue swelling at superomedial knee  Neg varus/valgus  Neg lachman  +single leg bend for distal quad pain in R leg      (M76.899) Quadriceps tendonitis  (primary encounter diagnosis)  Comment: exam today consistent w/ distal quad tendonopathy and chondromalacia patella  Plan: PHYSICAL THERAPY REFERRAL (Internal)            (M22.41) Chondromalacia of right patella  Comment: see above  Plan: PHYSICAL THERAPY REFERRAL (Internal)            Bob Nicolas MD  June 11, 2019  9:19 AM      Bob Nicolas MD    "

## 2019-06-11 NOTE — PROGRESS NOTES
"Pt is a 53 year old female here today for:     HPI:   R knee pain and crunching  - 1 year ago sat down and had extreme sharp pain that has since shifted towards more dull/aching  - uses ice and ibuprofen occasionally  - no catching/locking or feelings of gross instability  - has noticed a nontender \"lump\" superior to the medial joint line  - sx especially prevalent in knee flexion with activities like driving  - notices crunching when going up the stairs  - going to the [place] in November and wants to be ready for hiking  - no hx of injuries to the knee    Past Medical History:   Diagnosis Date     Hx of hysterectomy      Hyperglycemia during pregnancy      Hypothyroidism      Multiple thyroid nodules      Obesity      Reflux esophagitis      Vitamin D deficiency       Past Surgical History:   Procedure Laterality Date     BIOPSY OF SKIN LESION  2012    lipoma? resection abdomen and right thigh     BUNIONECTOMY  1985    bilateral     C EXCISION OF GANGLION CYST FOREARM  1997    from right hand     ENDOMETRIAL SAMPLING (BIOPSY)        Current Outpatient Medications   Medication Sig Dispense Refill     acetaminophen (TYLENOL) 325 MG tablet Take 325-650 mg by mouth every 6 hours as needed for mild pain       cholecalciferol ( ULTRA STRENGTH) 2000 units CAPS Take 2,000 Units by mouth daily        ibuprofen (ADVIL/MOTRIN) 200 MG tablet Take 200 mg by mouth every 4 hours as needed for mild pain 2-4 tablets when needed       levothyroxine (SYNTHROID/LEVOTHROID) 75 MCG tablet Take 1 tablet (75 mcg) by mouth daily 90 tablet 3     hydrochlorothiazide (HYDRODIURIL) 12.5 MG tablet Take 2 tablets (25 mg) by mouth daily (Patient not taking: Reported on 6/11/2019) 30 tablet 0      No Known Allergies   Social History     Tobacco Use     Smoking status: Former Smoker     Last attempt to quit: 6/10/2004     Years since quitting: 15.0     Smokeless tobacco: Never Used   Substance Use Topics     Alcohol use: Yes     " "Comment: occ.      Drug use: Never      Family History   Problem Relation Age of Onset     Chronic Obstructive Pulmonary Disease Mother      Emphysema Mother      Hypertension Mother      Aortic aneurysm Father      Breast Cancer Maternal Grandmother      Cancer Paternal Grandmother         Uterine      ROS:   Gen- no fevers/chills   Rheum - no morning stiffness   Derm - no rash/ redness   Neuro - no numbness, no tingling   Remainder of ROS negative.     Exam:   Ht 1.689 m (5' 6.5\")   Wt 102.8 kg (226 lb 10.1 oz)   BMI 36.03 kg/m         R Knee:   No effusion  Full ROM   +crepitus  5/5 strength  No tenderness at medial or lateral joint line  Neg patellar compression  +TTP at quad insertion and + soft tissue swelling at superomedial knee  Neg varus/valgus  Neg lachman  +single leg bend for distal quad pain in R leg      (M76.899) Quadriceps tendonitis  (primary encounter diagnosis)  Comment: exam today consistent w/ distal quad tendonopathy and chondromalacia patella  Plan: PHYSICAL THERAPY REFERRAL (Internal)            (M22.41) Chondromalacia of right patella  Comment: see above  Plan: PHYSICAL THERAPY REFERRAL (Internal)            Bob Nicolas MD  June 11, 2019  9:19 AM    "

## 2019-06-18 PROBLEM — I10 BENIGN ESSENTIAL HYPERTENSION: Status: ACTIVE | Noted: 2019-06-18

## 2019-07-02 DIAGNOSIS — I10 BENIGN ESSENTIAL HYPERTENSION: ICD-10-CM

## 2019-07-03 NOTE — TELEPHONE ENCOUNTER
"Requested Prescriptions   Pending Prescriptions Disp Refills     hydrochlorothiazide (HYDRODIURIL) 12.5 MG tablet [Pharmacy Med Name: HYDROCHLOROTHIAZIDE 12.5MG TABS]  Last Written Prescription Date:  06/10/19  Last Fill Quantity: 30,  # refills: 0   Last office visit: 6/10/2019 with prescribing provider:  EZRA Andre   Future Office Visit:     30 tablet 0     Sig: TAKE TWO TABLETS BY MOUTH ONCE DAILY       Diuretics (Including Combos) Protocol Failed - 7/2/2019  4:55 PM        Failed - Normal serum creatinine on file in past 12 months     Recent Labs   Lab Test 06/04/19  1350   CR 1.26*              Passed - Blood pressure under 140/90 in past 12 months     BP Readings from Last 3 Encounters:   06/11/19 138/85   06/10/19 128/84   06/04/19 (!) 143/93                 Passed - Recent (12 mo) or future (30 days) visit within the authorizing provider's specialty     Patient had office visit in the last 12 months or has a visit in the next 30 days with authorizing provider or within the authorizing provider's specialty.  See \"Patient Info\" tab in inbasket, or \"Choose Columns\" in Meds & Orders section of the refill encounter.              Passed - Medication is active on med list        Passed - Patient is age 18 or older        Passed - No active pregancy on record        Passed - Normal serum potassium on file in past 12 months     Recent Labs   Lab Test 06/04/19  1350   POTASSIUM 3.8                    Passed - Normal serum sodium on file in past 12 months     Recent Labs   Lab Test 06/04/19  1350                 Passed - No positive pregnancy test in past 12 months          "

## 2019-07-05 NOTE — TELEPHONE ENCOUNTER
Routing refill request to provider for review/approval because:  Labs out of range:  Cr  Last OV 6/10/19: Return in about 1 month (around 7/8/19) - DOES have an appt scheduled for Monday 7/8/19.    Montse FELICIANO RN

## 2019-07-06 RX ORDER — HYDROCHLOROTHIAZIDE 12.5 MG/1
TABLET ORAL
Qty: 30 TABLET | Refills: 0 | Status: SHIPPED | OUTPATIENT
Start: 2019-07-06 | End: 2019-07-08

## 2019-07-08 ENCOUNTER — OFFICE VISIT (OUTPATIENT)
Dept: FAMILY MEDICINE | Facility: CLINIC | Age: 54
End: 2019-07-08
Payer: COMMERCIAL

## 2019-07-08 VITALS
WEIGHT: 227 LBS | HEART RATE: 75 BPM | DIASTOLIC BLOOD PRESSURE: 62 MMHG | RESPIRATION RATE: 14 BRPM | HEIGHT: 67 IN | OXYGEN SATURATION: 96 % | BODY MASS INDEX: 35.63 KG/M2 | TEMPERATURE: 98.5 F | SYSTOLIC BLOOD PRESSURE: 120 MMHG

## 2019-07-08 DIAGNOSIS — I10 BENIGN ESSENTIAL HYPERTENSION: ICD-10-CM

## 2019-07-08 LAB
ANION GAP SERPL CALCULATED.3IONS-SCNC: 5 MMOL/L (ref 3–14)
BUN SERPL-MCNC: 17 MG/DL (ref 7–30)
CALCIUM SERPL-MCNC: 9.4 MG/DL (ref 8.5–10.1)
CHLORIDE SERPL-SCNC: 104 MMOL/L (ref 94–109)
CO2 SERPL-SCNC: 26 MMOL/L (ref 20–32)
CREAT SERPL-MCNC: 0.75 MG/DL (ref 0.52–1.04)
GFR SERPL CREATININE-BSD FRML MDRD: >90 ML/MIN/{1.73_M2}
GLUCOSE SERPL-MCNC: 128 MG/DL (ref 70–99)
POTASSIUM SERPL-SCNC: 3.7 MMOL/L (ref 3.4–5.3)
SODIUM SERPL-SCNC: 135 MMOL/L (ref 133–144)

## 2019-07-08 PROCEDURE — 80048 BASIC METABOLIC PNL TOTAL CA: CPT | Performed by: FAMILY MEDICINE

## 2019-07-08 PROCEDURE — 36415 COLL VENOUS BLD VENIPUNCTURE: CPT | Performed by: FAMILY MEDICINE

## 2019-07-08 PROCEDURE — 99213 OFFICE O/P EST LOW 20 MIN: CPT | Performed by: FAMILY MEDICINE

## 2019-07-08 RX ORDER — HYDROCHLOROTHIAZIDE 12.5 MG/1
25 TABLET ORAL DAILY
Qty: 180 TABLET | Refills: 3 | Status: SHIPPED | OUTPATIENT
Start: 2019-07-08

## 2019-07-08 ASSESSMENT — MIFFLIN-ST. JEOR: SCORE: 1662.3

## 2019-07-08 NOTE — PROGRESS NOTES
Subjective     Sydney Madrigal is a 53 year old female who presents to clinic today for the following health issues:    53 yr old female here for recheck of her BP . She was seen about a month ago for palpitations and her Holter monitor came back okay. She reports that she is tolerating the hydrochlorothiazide so far. She reports that she noticed less tightening of her hands. BP is responding well.     HPI     Chief Complaint   Patient presents with     Hypertension     Results     Refill Request     Hypertension Follow-up      Do you check your blood pressure regularly outside of the clinic? No     Are you following a low salt diet? Yes    Are your blood pressures ever more than 140 on the top number (systolic) OR more   than 90 on the bottom number (diastolic), for example 140/90? No    Amount of exercise or physical activity: minimal     Problems taking medications regularly: No    Medication side effects: none    Diet: regular (no restrictions)      Medication Followup of HCTZ    Taking Medication as prescribed: yes    Side Effects:  None    Medication Helping Symptoms:  yes     Results of Zio patch patient would like to go over     Patient Active Problem List   Diagnosis     Benign essential hypertension     Past Surgical History:   Procedure Laterality Date     BIOPSY OF SKIN LESION  2012    lipoma? resection abdomen and right thigh     BUNIONECTOMY  1985    bilateral     C EXCISION OF GANGLION CYST FOREARM  1997    from right hand     ENDOMETRIAL SAMPLING (BIOPSY)         Social History     Tobacco Use     Smoking status: Former Smoker     Last attempt to quit: 6/10/2004     Years since quitting: 15.0     Smokeless tobacco: Never Used   Substance Use Topics     Alcohol use: Yes     Comment: occ.      Family History   Problem Relation Age of Onset     Chronic Obstructive Pulmonary Disease Mother      Emphysema Mother      Hypertension Mother      Aortic aneurysm Father      Breast Cancer Maternal Grandmother   "    Cancer Paternal Grandmother         Uterine         Current Outpatient Medications   Medication Sig Dispense Refill     acetaminophen (TYLENOL) 325 MG tablet Take 325-650 mg by mouth every 6 hours as needed for mild pain       cholecalciferol ( ULTRA STRENGTH) 2000 units CAPS Take 2,000 Units by mouth daily        hydrochlorothiazide (HYDRODIURIL) 12.5 MG tablet Take 2 tablets (25 mg) by mouth daily 180 tablet 3     ibuprofen (ADVIL/MOTRIN) 200 MG tablet Take 200 mg by mouth every 4 hours as needed for mild pain 2-4 tablets when needed       levothyroxine (SYNTHROID/LEVOTHROID) 75 MCG tablet Take 1 tablet (75 mcg) by mouth daily 90 tablet 3     No Known Allergies  BP Readings from Last 3 Encounters:   07/08/19 120/62   06/11/19 138/85   06/10/19 128/84    Wt Readings from Last 3 Encounters:   07/08/19 103 kg (227 lb)   06/11/19 102.8 kg (226 lb 10.1 oz)   06/10/19 102.8 kg (226 lb 9.6 oz)              Reviewed and updated as needed this visit by Provider         Review of Systems   ROS COMP: Constitutional, HEENT, cardiovascular, pulmonary, gi and gu systems are negative, except as otherwise noted.      Objective    /62   Pulse 75   Temp 98.5  F (36.9  C) (Tympanic)   Resp 14   Ht 1.702 m (5' 7\")   Wt 103 kg (227 lb)   SpO2 96%   BMI 35.55 kg/m    Body mass index is 35.55 kg/m .  Physical Exam   GENERAL: healthy, alert and no distress  EYES: Eyes grossly normal to inspection, PERRL and conjunctivae and sclerae normal  HENT: ear canals and TM's normal, nose and mouth without ulcers or lesions  NECK: no adenopathy, no asymmetry, masses, or scars and thyroid normal to palpation  RESP: lungs clear to auscultation - no rales, rhonchi or wheezes  CV: regular rate and rhythm, normal S1 S2, no S3 or S4, no murmur, click or rub, no peripheral edema and peripheral pulses strong  ABDOMEN: soft, nontender, no hepatosplenomegaly, no masses and bowel sounds normal  MS: no gross musculoskeletal defects noted, " no edema    Diagnostic Test Results:  Pending         Assessment & Plan     1. Benign essential hypertension  - hydrochlorothiazide (HYDRODIURIL) 12.5 MG tablet; Take 2 tablets (25 mg) by mouth daily  Dispense: 180 tablet; Refill: 3  - **Basic metabolic panel FUTURE anytime  BP is responding well. Palpitations have resolved.           FUTURE APPOINTMENTS:       - Follow-up visit in one month or sooner as needed    No follow-ups on file.    Padmini Andre MD  [facility]

## 2019-07-08 NOTE — PATIENT INSTRUCTIONS
Patient Education     Discharge Instructions for High Blood Pressure (Hypertension)  You have been diagnosed with high blood pressure (also called hypertension). This means the force of blood against your artery walls is too strong. It also means your heart is working hard to move blood. High blood pressure usually has no symptoms, but over time, it can cause serious health problems. High blood pressure raises your risk for heart attack, stroke, heart failure, kidney disease, and blindness. With help from your doctor, you can manage your blood pressure and protect your health.  Blood pressure measurements are given as 2 numbers. Systolic blood pressure is the upper number. This is the pressure when the heart contracts. Diastolic blood pressure is the lower number. This is the pressure when the heart relaxes between beats.  Blood pressure is categorized as normal, elevated, or stage 1 or stage 2 high blood pressure:    Normal blood pressure is systolic of less than 120 and diastolic of less than 80 (120/80)    Elevated blood pressure is systolic of 120 to 129 and diastolic less than 80    Stage 1 high blood pressure is systolic is 130 to 139 or diastolic between 80 to 89    Stage 2 high blood pressure is when systolic is 140 or higher or the diastolic is 90 or higher  Taking medicine    Learn to take your own blood pressure. Keep a record of your results. Ask your doctor which readings mean that you need medical attention.    Take your blood pressure medicine exactly as directed. Don t skip doses. Missing doses can cause your blood pressure to get out of control.    If you do miss a dose (or doses) check with your healthcare provider about what to do.    Don't take medicines that contain heart stimulants, including over-the-counter medicines. Check for warnings about high blood pressure on the label. Ask the pharmacist before purchasing something you haven't used before    Check with your doctor or pharmacist  "before taking a decongestant. Some decongestants can worsen high blood pressure.  Lifestyle changes    Maintain a healthy weight. Get help to lose any extra pounds.    Cut back on salt.  ? Limit canned, dried, packaged, and fast foods.  ? Don t add salt to your food at the table.  ? Season foods with herbs instead of salt when you cook.  ? Request no added salt when you go to a restaurant.  ? The American Heart Association (AHA) says the \"ideal\" amount of sodium is no more than 1,500 mg a day.  But because Americans eat so much salt, you can make a positive change by cutting back to even 2,400 mg of sodium a day.     Follow the DASH (Dietary Approaches to Stop Hypertension) eating plan. This plan recommends vegetables, fruits, whole gains, and other heart healthy foods.    Begin an exercise program. Ask your healthcare provider how to get started. The AHA recommends aerobic exercise 3 to 4 times a week for an average of 40 minutes at a time, with your provider's approval. Simple activities like walking or gardening can help.    Break the smoking habit. Enroll in a stop-smoking program to improve your chances of success. Ask your healthcare provider about programs and medicines to help you stop smoking.    Limit drinks that contain caffeine such as coffee, black or green tea, and cola to 2 per day.    Never take stimulants such as amphetamines or cocaine. These drugs can be deadly for someone with high blood pressure.    Control your stress. Learn ways to manage stress.    Limit alcohol to no more than 1 drink a day for women and 2 drinks a day for men.  Follow-up care  Make a follow-up appointment as directed.  When to call your healthcare provider  Call your healthcare provider right away if you have any of the following:    Chest pain or shortness of breath (call 911)    Moderate to severe headache    Weakness in the muscles of your face, arms, or legs    Trouble speaking    Extreme " drowsiness    Confusion    Fainting or dizziness    Pulsating or rushing sound in your ears    Unexplained nosebleed    Weakness, tingling, or numbness of your face, arms, or legs    Change in vision    Blood pressure measured at home that is greater than 180/110   Date Last Reviewed: 4/27/2016 2000-2018 [employer]. [address] [place] [address]. All rights reserved. This information is not intended as a substitute for professional medical care. Always follow your healthcare professional's instructions.

## 2019-07-08 NOTE — RESULT ENCOUNTER NOTE
Please inform patient that test result was within normal parameters except for blood glucose was slightly high but if she was not fasting it may not matter.   Thank you.     Padmini Andre M.D.

## 2019-07-22 ENCOUNTER — THERAPY VISIT (OUTPATIENT)
Dept: PHYSICAL THERAPY | Facility: CLINIC | Age: 54
End: 2019-07-22
Attending: FAMILY MEDICINE
Payer: COMMERCIAL

## 2019-07-22 DIAGNOSIS — M76.899 QUADRICEPS TENDONITIS: ICD-10-CM

## 2019-07-22 DIAGNOSIS — M22.41 CHONDROMALACIA OF RIGHT PATELLA: ICD-10-CM

## 2019-07-22 PROCEDURE — 97110 THERAPEUTIC EXERCISES: CPT | Mod: GP | Performed by: PHYSICAL THERAPIST

## 2019-07-22 PROCEDURE — 97035 APP MDLTY 1+ULTRASOUND EA 15: CPT | Mod: GP | Performed by: PHYSICAL THERAPIST

## 2019-07-22 PROCEDURE — 97161 PT EVAL LOW COMPLEX 20 MIN: CPT | Mod: GP | Performed by: PHYSICAL THERAPIST

## 2019-07-22 ASSESSMENT — ACTIVITIES OF DAILY LIVING (ADL)
LIMPING: I HAVE THE SYMPTOM BUT IT DOES NOT AFFECT MY ACTIVITY
RISE FROM A CHAIR: ACTIVITY IS SOMEWHAT DIFFICULT
SWELLING: I HAVE THE SYMPTOM BUT IT DOES NOT AFFECT MY ACTIVITY
GO UP STAIRS: ACTIVITY IS SOMEWHAT DIFFICULT
PAIN: I HAVE THE SYMPTOM BUT IT DOES NOT AFFECT MY ACTIVITY
HOW_WOULD_YOU_RATE_THE_CURRENT_FUNCTION_OF_YOUR_KNEE_DURING_YOUR_USUAL_DAILY_ACTIVITIES_ON_A_SCALE_FROM_0_TO_100_WITH_100_BEING_YOUR_LEVEL_OF_KNEE_FUNCTION_PRIOR_TO_YOUR_INJURY_AND_0_BEING_THE_INABILITY_TO_PERFORM_ANY_OF_YOUR_USUAL_DAILY_ACTIVITIES?: 85
HOW_WOULD_YOU_RATE_THE_OVERALL_FUNCTION_OF_YOUR_KNEE_DURING_YOUR_USUAL_DAILY_ACTIVITIES?: NEARLY NORMAL
AS_A_RESULT_OF_YOUR_KNEE_INJURY,_HOW_WOULD_YOU_RATE_YOUR_CURRENT_LEVEL_OF_DAILY_ACTIVITY?: NORMAL
KNEEL ON THE FRONT OF YOUR KNEE: ACTIVITY IS SOMEWHAT DIFFICULT
RAW_SCORE: 48.46
SQUAT: ACTIVITY IS VERY DIFFICULT
WEAKNESS: THE SYMPTOM AFFECTS MY ACTIVITY SLIGHTLY
STAND: NOT ANSWERED
SIT WITH YOUR KNEE BENT: ACTIVITY IS SOMEWHAT DIFFICULT
KNEE_ACTIVITY_OF_DAILY_LIVING_SCORE: 69.23
WALK: ACTIVITY IS NOT DIFFICULT
GO DOWN STAIRS: ACTIVITY IS SOMEWHAT DIFFICULT
GIVING WAY, BUCKLING OR SHIFTING OF KNEE: I DO NOT HAVE THE SYMPTOM
STIFFNESS: I HAVE THE SYMPTOM BUT IT DOES NOT AFFECT MY ACTIVITY
KNEE_ACTIVITY_OF_DAILY_LIVING_SUM: 45

## 2019-07-22 NOTE — PROGRESS NOTES
[facility] Initial Evaluation  Subjective:  The history is provided by the patient.   Sydney Madrigal being seen for Rt knee pain/dysfunction .   Problem began 1/1/2017. Problem occurred: sitting into a chair, sudden immediate pain   and reported as 9/10 on pain scale. General health as reported by patient is good. Pertinent medical history includes:  Osteoarthritis and thyroid problems.         Primary job tasks include:  Prolonged standing.  Pain is described as sharp and aching and is constant.        Patient is pharmacy tech,. 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    Barriers include:  None as reported by patient.  Red flags:  None as reported by patient.                      Objective:  System                                                Knee Evaluation:  ROM:  AROM: normal  PROM: normal      PROM    Hyperextension: Left: 6    Right:  6            Ligament Testing:    Varus 0:  Left:  Neg   Right:  Neg  Varus 30:  Left:  Neg  Right:  Neg  Valgus 0:  Left:  Neg  Right:  Neg  Valgus 30:  Left:  Neg    Right:  Neg  Anterior Drawer:  Left:  Gr I and Neg    Right:  Gr I and Neg  Posterior Drawer: Left:  Neg  Right:  Neg    Special Tests:   Left knee positive for the following special tests:  IT Band Friction  Left knee negative for the following special tests:  Plica; Meninscal; Patellar compression; Rotary Instability-Anterior Medial; Rotary Instability-Anterior Lateral; Rotary Instability-Posterior Medial; Rotary Instability-Posterior Lateral; Patellar Tracking-Abduction Medial; Patellar Tracking-Abduction Lateral and Alejandrina's  Right knee positive for the following tests:  IT Band Friction  Right knee negative for the following special tests:  Plica; Meniscal; Patellar Compression; Rotary Instability-Anterior Medial; Rotary Instability-Anterior Lateral; Rotary Instability-Posterior Medial; Rotary Instability-Posterior Lateral; Patellar Tracking-Abduction Medial;  Patellar Tracking-Abduction Lateral and Alejandrina's  Palpation:      Right knee tenderness present at:  Patellar Tendon and Patellar Inferior  Edema:  Edema of the knee: visible medial puffiness, full flexn ROM neg compress sign     Mobility Testing:  Not Assessed            Functional Testing:  not assessed                  General     ROS    Assessment/Plan:    Patient is a 53 year old female with right side knee complaints.    Patient has the following significant findings with corresponding treatment plan.                Diagnosis 1:  Rt knee pain   Pain -  hot/cold therapy, US, manual therapy, self management and home program  Inflammation - cold therapy and US  Impaired muscle performance - neuro re-education and home program  Decreased function - therapeutic activities and home program    Therapy Evaluation Codes:   1) History comprised of:   Personal factors that impact the plan of care:      Coping style, Overall behavior pattern and Past/current experiences.    Comorbidity factors that impact the plan of care are:      Thyroid .     Medications impacting care: Pain.  2) Examination of Body Systems comprised of:   Body structures and functions that impact the plan of care:      Knee.   Activity limitations that impact the plan of care are:      Bending, Cooking, Lifting, Squatting/kneeling, Stairs, Standing and Walking.  3) Clinical presentation characteristics are:   Stable/Uncomplicated.  4) Decision-Making    Low complexity using standardized patient assessment instrument and/or measureable assessment of functional outcome.  Cumulative Therapy Evaluation is: Low complexity.    Previous and current functional limitations:  (See Goal Flow Sheet for this information)    Short term and Long term goals: (See Goal Flow Sheet for this information)     Communication ability:  Patient appears to be able to clearly communicate and understand verbal and written communication and follow directions correctly.  Treatment  Explanation - The following has been discussed with the patient:   RX ordered/plan of care  Anticipated outcomes  Possible risks and side effects  This patient would benefit from PT intervention to resume normal activities.   Rehab potential is good.    Frequency:  2 X week, once daily tapering to 1x per wk as able   Duration:  for 6 weeks  Discharge Plan:  Achieve all LTG.  Independent in home treatment program.  Reach maximal therapeutic benefit.    Please refer to the daily flowsheet for treatment today, total treatment time and time spent performing 1:1 timed codes.

## 2019-07-22 NOTE — LETTER
ELIESER FREEMAN PT  [address]  [address]  [place] [address]  [phone]    2019    Re: Sydney Madrigal   :   1965  MRN:  [id number]   REFERRING PHYSICIAN:   MD ELIESER Wang PT  Date of Initial Evaluation:  2019  Visits:  Rxs Used: 1  Reason for Referral:     Quadriceps tendonitis  Chondromalacia of right patella    EVALUATION SUMMARY    [facility] Initial Evaluation  Subjective:  The history is provided by the patient.   Sydney Madrigal being seen for Rt knee pain/dysfunction .   Problem began 2017. Problem occurred: sitting into a chair, sudden immediate pain   and reported as 9/10 on pain scale. General health as reported by patient is good. Pertinent medical history includes:  Osteoarthritis and thyroid problems.  Primary job tasks include:  Prolonged standing.  Pain is described as sharp and aching and is constant.  Patient is pharmacy tech,. 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    Barriers include:  None as reported by patient.  Red flags:  None as reported by patient.    Objective:  Knee Evaluation:  ROM:  AROM: normal  PROM: normal    PROM  Hyperextension: Left: 6    Right:  6    Ligament Testing:    Varus 0:  Left:  Neg   Right:  Neg  Varus 30:  Left:  Neg  Right:  Neg  Valgus 0:  Left:  Neg  Right:  Neg  Valgus 30:  Left:  Neg    Right:  Neg  Anterior Drawer:  Left:  Gr I and Neg    Right:  Gr I and Neg  Posterior Drawer: Left:  Neg  Right:  Neg    Special Tests:   Left knee positive for the following special tests:  IT Band Friction  Left knee negative for the following special tests:  Plica; Meninscal; Patellar compression; Rotary Instability-Anterior Medial; Rotary Instability-Anterior Lateral; Rotary Instability-Posterior Medial; Rotary Instability-Posterior Lateral; Patellar   Re: Sydney Madrigal   :   1965    Tracking-Abduction Medial; Patellar Tracking-Abduction Lateral and Alejandrina's  Right  knee positive for the following tests:  IT Band Friction  Right knee negative for the following special tests:  Plica; Meniscal; Patellar Compression; Rotary Instability-Anterior Medial; Rotary Instability-Anterior Lateral; Rotary Instability-Posterior Medial; Rotary Instability-Posterior Lateral; Patellar Tracking-Abduction Medial; Patellar Tracking-Abduction Lateral and Alejandrina's  Palpation:      Right knee tenderness present at:  Patellar Tendon and Patellar Inferior  Edema:  Edema of the knee: visible medial puffiness, full flexn ROM neg compress sign     Mobility Testing:  Not Assessed  Functional Testing:  not assessed    Assessment/Plan:    Patient is a 53 year old female with right side knee complaints.    Patient has the following significant findings with corresponding treatment plan.                Diagnosis 1:  Rt knee pain   Pain -  hot/cold therapy, US, manual therapy, self management and home program  Inflammation - cold therapy and US  Impaired muscle performance - neuro re-education and home program  Decreased function - therapeutic activities and home program    Therapy Evaluation Codes:   1) History comprised of:   Personal factors that impact the plan of care:      Coping style, Overall behavior pattern and Past/current experiences.    Comorbidity factors that impact the plan of care are:      Thyroid .     Medications impacting care: Pain.  2) Examination of Body Systems comprised of:   Body structures and functions that impact the plan of care:      Knee.   Activity limitations that impact the plan of care are:      Bending, Cooking, Lifting, Squatting/kneeling, Stairs, Standing and Walking.  3) Clinical presentation characteristics are:   Stable/Uncomplicated.  4) Decision-Making    Low complexity using standardized patient assessment instrument and/or measureable assessment of functional outcome.  Cumulative Therapy Evaluation is: Low complexity.    Previous and current functional limitations:   (See Goal Flow Sheet for this information)    Short term and Long term goals: (See Goal Flow Sheet for this information)     Communication ability:  Patient appears to be able to clearly communicate and understand verbal and written communication and follow directions correctly.  Treatment Explanation - The following has been discussed with the patient:   RX ordered/plan of care  Anticipated outcomes  Re: Sydney Madrigal   :   1965    Possible risks and side effects  This patient would benefit from PT intervention to resume normal activities.   Rehab potential is good.    Frequency:  2 X week, once daily tapering to 1x per wk as able   Duration:  for 6 weeks  Discharge Plan:  Achieve all LTG.  Independent in home treatment program.  Reach maximal therapeutic benefit.    Please refer to the daily flowsheet for treatment today, total treatment time and time spent performing 1:1 timed codes.         Thank you for your referral.    INQUIRIES  Therapist: Asher Reyes PT   ELIESER FREEMAN PT  [address]  [address]  [place] [address]  Phone: [phone]  Fax: [phone]

## 2019-07-25 ENCOUNTER — THERAPY VISIT (OUTPATIENT)
Dept: PHYSICAL THERAPY | Facility: CLINIC | Age: 54
End: 2019-07-25
Payer: COMMERCIAL

## 2019-07-25 DIAGNOSIS — M22.41 CHONDROMALACIA OF RIGHT PATELLA: Primary | ICD-10-CM

## 2019-07-25 PROCEDURE — 97112 NEUROMUSCULAR REEDUCATION: CPT | Mod: GP | Performed by: PHYSICAL THERAPIST

## 2019-07-25 PROCEDURE — 97035 APP MDLTY 1+ULTRASOUND EA 15: CPT | Mod: GP | Performed by: PHYSICAL THERAPIST

## 2019-07-29 ENCOUNTER — THERAPY VISIT (OUTPATIENT)
Dept: PHYSICAL THERAPY | Facility: CLINIC | Age: 54
End: 2019-07-29
Payer: COMMERCIAL

## 2019-07-29 DIAGNOSIS — M22.41 CHONDROMALACIA OF RIGHT PATELLA: ICD-10-CM

## 2019-07-29 PROCEDURE — 97112 NEUROMUSCULAR REEDUCATION: CPT | Mod: GP

## 2019-07-29 PROCEDURE — 97035 APP MDLTY 1+ULTRASOUND EA 15: CPT | Mod: GP

## 2019-08-12 ENCOUNTER — THERAPY VISIT (OUTPATIENT)
Dept: PHYSICAL THERAPY | Facility: CLINIC | Age: 54
End: 2019-08-12
Payer: COMMERCIAL

## 2019-08-12 DIAGNOSIS — M22.41 CHONDROMALACIA OF RIGHT PATELLA: ICD-10-CM

## 2019-08-12 PROCEDURE — 97530 THERAPEUTIC ACTIVITIES: CPT | Mod: GP

## 2019-10-31 PROBLEM — M22.41 CHONDROMALACIA OF RIGHT PATELLA: Status: RESOLVED | Noted: 2019-07-22 | Resolved: 2019-10-31

## 2020-07-22 NOTE — PROGRESS NOTES
"Sydney Madrigal is a 54 year old female who is being evaluated via a billable telephone visit.      The patient has been notified of following:     \"This telephone visit will be conducted via a call between you and your physician/provider. We have found that certain health care needs can be provided without the need for a physical exam.  This service lets us provide the care you need with a short phone conversation.  If a prescription is necessary we can send it directly to your pharmacy.  If lab work is needed we can place an order for that and you can then stop by our lab to have the test done at a later time.    Telephone visits are billed at different rates depending on your insurance coverage. During this emergency period, for some insurers they may be billed the same as an in-person visit.  Please reach out to your insurance provider with any questions.    If during the course of the call the physician/provider feels a telephone visit is not appropriate, you will not be charged for this service.\"    Patient has given verbal consent for Telephone visit?  Yes    What phone number would you like to be contacted at? [phone]    How would you like to obtain your AVS? Mail a copy    Phone call duration: 10 minutes    Divya Escalante CMA    S:   Patient presents for management of hypothyroidism.   Last seen by Dr Perez:  Pre-2006. Diagnosis of hypothyroidism  Details of initial evaluation, symptoms, lab testing not available  Started levothyroxine medication  She recalls chronic treatment with levothyroxine 0.075 mg daily dose  Has seen [facility] and [facility] [place]  2006. Previous diagnosis of thyroid nodules  Has had many thyroid U/S procedures in the past, possibly yearly... Scans at [facility]  2006. Dominant thyroid nodule FNA.              Results non-diagnostic, apparently due to name mis-spelling with her account, path details not available              Painful " biopsy procedure despite local anesthetic, and she has not wished a subsequent FNA w/o general anesthesia  11/23/16 Thyroid U/S:              Right lobe 5.1 x 1.3 x 0.9 cm and left lobe 4.8 x 1.3 x 1.3 cm              RUL 0.8 x 0.7 x 0.6 cm hypoechoic nodule, RLL 0.4 x 0.3 x 0.3 cm hypoechoic nodule                   DILLON 0.6 x 0.6 x 0.4 cm hypoechoic nodule, LML-LLL 2.1 x 1.2 x 1.0 cm mixed echogenicity nodule     4/2019:           She is not having any neck pain or dysphagia.   She takes 75 mcg of levothyroxine daily.   She is actively trying to loose weight and has also had liposuction done in the last year.   Post menopause. She has daily hot flashes which occasionally disrupt her sleep.     ROS: 10 point ROS neg other than the symptoms noted above in the HPI.     A/P:   Hypothyroidism - Extensive discussion of thyroid hormone and normal physiology. Included was discussion of thyroid in relation to weight and energy.    -TSH ordered.     Thyroid nodules - I have reviewed the natural history of thyroid nodules and thyroid cancer with the patient.   Non-diagnostic FNA of dominant (left lower) nodule in 2006.   Last US was done in 4/2019. Three sub-centimeter nodules seen. The fourth nodule was 1.6X0.7X0.7 cm in size, left inferior, with a TI RADS score of 7. This is smaller than in 2016 but it is now mostly solid and has calcifications.   -Schedule an ultrasound. She would need pre-medication for a FNA if needed. She would prefer general anesthesia. I told her I would have to ask about this as I have never done this before.     Due to the COVID 19 pandemic this visit was a telephone/video visit in order to help prevent spread of infection in this high risk patient and the general population. The patient gave verbal consent for the visit today.    Start time 0935  Stop time 0951  Total time 16  This visit would have been billed as [id number] as an E & M code    Armando Hensley MD on 7/23/2020 at 9:51 AM

## 2020-07-23 ENCOUNTER — VIRTUAL VISIT (OUTPATIENT)
Dept: ENDOCRINOLOGY | Facility: CLINIC | Age: 55
End: 2020-07-23
Payer: COMMERCIAL

## 2020-07-23 DIAGNOSIS — E03.9 HYPOTHYROIDISM, UNSPECIFIED TYPE: Primary | ICD-10-CM

## 2020-07-23 DIAGNOSIS — E04.2 MULTIPLE THYROID NODULES: ICD-10-CM

## 2020-07-23 PROCEDURE — 99213 OFFICE O/P EST LOW 20 MIN: CPT | Mod: 95 | Performed by: INTERNAL MEDICINE

## 2020-07-23 NOTE — LETTER
"    7/23/2020         RE: Sydney Madrigal  [address]  [place] [address]        Dear Colleague,    Thank you for referring your patient, Sydney Madrigal, to the [facility]. Please see a copy of my visit note below.    Sydney Madrigal is a 54 year old female who is being evaluated via a billable telephone visit.      The patient has been notified of following:     \"This telephone visit will be conducted via a call between you and your physician/provider. We have found that certain health care needs can be provided without the need for a physical exam.  This service lets us provide the care you need with a short phone conversation.  If a prescription is necessary we can send it directly to your pharmacy.  If lab work is needed we can place an order for that and you can then stop by our lab to have the test done at a later time.    Telephone visits are billed at different rates depending on your insurance coverage. During this emergency period, for some insurers they may be billed the same as an in-person visit.  Please reach out to your insurance provider with any questions.    If during the course of the call the physician/provider feels a telephone visit is not appropriate, you will not be charged for this service.\"    Patient has given verbal consent for Telephone visit?  Yes    What phone number would you like to be contacted at? [phone]    How would you like to obtain your AVS? Mail a copy    Phone call duration: 10 minutes    Divya Escalante CMA    S:   Patient presents for management of hypothyroidism.   Last seen by Dr Perez:  Pre-2006. Diagnosis of hypothyroidism  Details of initial evaluation, symptoms, lab testing not available  Started levothyroxine medication  She recalls chronic treatment with levothyroxine 0.075 mg daily dose  Has seen [facility] and [facility] [place]  2006. Previous diagnosis of thyroid nodules  Has had many thyroid U/S procedures in " the past, possibly yearly... Scans at [facility]  2006. Dominant thyroid nodule FNA.              Results non-diagnostic, apparently due to name mis-spelling with her account, path details not available              Painful biopsy procedure despite local anesthetic, and she has not wished a subsequent FNA w/o general anesthesia  11/23/16 Thyroid U/S:              Right lobe 5.1 x 1.3 x 0.9 cm and left lobe 4.8 x 1.3 x 1.3 cm              RUL 0.8 x 0.7 x 0.6 cm hypoechoic nodule, RLL 0.4 x 0.3 x 0.3 cm hypoechoic nodule                   DILLON 0.6 x 0.6 x 0.4 cm hypoechoic nodule, LML-LLL 2.1 x 1.2 x 1.0 cm mixed echogenicity nodule     4/2019:           She is not having any neck pain or dysphagia.   She takes 75 mcg of levothyroxine daily.   She is actively trying to loose weight and has also had liposuction done in the last year.   Post menopause. She has daily hot flashes which occasionally disrupt her sleep.     ROS: 10 point ROS neg other than the symptoms noted above in the HPI.     A/P:   Hypothyroidism - Extensive discussion of thyroid hormone and normal physiology. Included was discussion of thyroid in relation to weight and energy.    -TSH ordered.     Thyroid nodules - I have reviewed the natural history of thyroid nodules and thyroid cancer with the patient.   Non-diagnostic FNA of dominant (left lower) nodule in 2006.   Last US was done in 4/2019. Three sub-centimeter nodules seen. The fourth nodule was 1.6X0.7X0.7 cm in size, left inferior, with a TI RADS score of 7. This is smaller than in 2016 but it is now mostly solid and has calcifications.   -Schedule an ultrasound. She would need pre-medication for a FNA if needed. She would prefer general anesthesia. I told her I would have to ask about this as I have never done this before.     Due to the COVID 19 pandemic this visit was a telephone/video visit in order to help prevent spread of infection in this high risk patient and the general population.  The patient gave verbal consent for the visit today.    Start time 0935  Stop time 0951  Total time 16  This visit would have been billed as [id number] as an E & M code    Armando Hensley MD on 7/23/2020 at 9:51 AM        Again, thank you for allowing me to participate in the care of your patient.        Sincerely,        Armando Hensley MD

## 2020-07-28 DIAGNOSIS — E03.9 HYPOTHYROIDISM, UNSPECIFIED TYPE: ICD-10-CM

## 2020-07-28 PROCEDURE — 84443 ASSAY THYROID STIM HORMONE: CPT | Performed by: FAMILY MEDICINE

## 2020-07-28 PROCEDURE — 36415 COLL VENOUS BLD VENIPUNCTURE: CPT | Performed by: FAMILY MEDICINE

## 2020-07-29 ENCOUNTER — TELEPHONE (OUTPATIENT)
Dept: ENDOCRINOLOGY | Facility: CLINIC | Age: 55
End: 2020-07-29

## 2020-07-29 LAB — TSH SERPL DL<=0.005 MIU/L-ACNC: 1.2 MU/L (ref 0.4–4)

## 2020-07-29 NOTE — TELEPHONE ENCOUNTER
Reason for call:  Other   Patient called regarding (reason for call): prescription  Additional comments: patient calling. She had her labs done. Please call in new rx's for her.     Phone number to reach patient:  Home number on file [phone] (home)    Best Time:   Any     Can we leave a detailed message on this number?  YES    Travel screening: Not Applicable

## 2020-07-30 ENCOUNTER — ANCILLARY PROCEDURE (OUTPATIENT)
Dept: ULTRASOUND IMAGING | Facility: CLINIC | Age: 55
End: 2020-07-30
Attending: INTERNAL MEDICINE
Payer: COMMERCIAL

## 2020-07-30 DIAGNOSIS — E03.9 HYPOTHYROIDISM, UNSPECIFIED TYPE: ICD-10-CM

## 2020-07-30 DIAGNOSIS — E04.2 MULTIPLE THYROID NODULES: Primary | ICD-10-CM

## 2020-07-30 PROCEDURE — 76536 US EXAM OF HEAD AND NECK: CPT

## 2020-07-30 RX ORDER — LEVOTHYROXINE SODIUM 75 UG/1
75 TABLET ORAL DAILY
Qty: 90 TABLET | Refills: 3 | Status: SHIPPED | OUTPATIENT
Start: 2020-07-30

## 2021-01-10 ENCOUNTER — HEALTH MAINTENANCE LETTER (OUTPATIENT)
Age: 56
End: 2021-01-10

## 2021-10-23 ENCOUNTER — HEALTH MAINTENANCE LETTER (OUTPATIENT)
Age: 56
End: 2021-10-23

## 2022-02-12 ENCOUNTER — HEALTH MAINTENANCE LETTER (OUTPATIENT)
Age: 57
End: 2022-02-12

## 2022-10-10 ENCOUNTER — HEALTH MAINTENANCE LETTER (OUTPATIENT)
Age: 57
End: 2022-10-10

## 2023-02-18 ENCOUNTER — HEALTH MAINTENANCE LETTER (OUTPATIENT)
Age: 58
End: 2023-02-18

## 2024-03-16 ENCOUNTER — HEALTH MAINTENANCE LETTER (OUTPATIENT)
Age: 59
End: 2024-03-16